# Patient Record
Sex: MALE | Race: WHITE | Employment: FULL TIME | URBAN - METROPOLITAN AREA
[De-identification: names, ages, dates, MRNs, and addresses within clinical notes are randomized per-mention and may not be internally consistent; named-entity substitution may affect disease eponyms.]

---

## 2018-07-19 ENCOUNTER — OFFICE VISIT (OUTPATIENT)
Dept: ORTHOPEDIC SURGERY | Age: 33
End: 2018-07-19

## 2018-07-19 VITALS — WEIGHT: 182 LBS | BODY MASS INDEX: 26.96 KG/M2 | HEIGHT: 69 IN

## 2018-07-19 DIAGNOSIS — S43.432A TEAR OF LEFT GLENOID LABRUM, INITIAL ENCOUNTER: Primary | ICD-10-CM

## 2018-07-19 PROCEDURE — L3670 SO ACRO/CLAV CAN WEB PRE OTS: HCPCS | Performed by: ORTHOPAEDIC SURGERY

## 2018-07-19 PROCEDURE — 99204 OFFICE O/P NEW MOD 45 MIN: CPT | Performed by: ORTHOPAEDIC SURGERY
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
## 2018-07-25 ENCOUNTER — TELEPHONE (OUTPATIENT)
Dept: ORTHOPEDIC SURGERY | Age: 33
End: 2018-07-25

## 2018-07-25 NOTE — TELEPHONE ENCOUNTER
Auth: NPR  Date: 8/10/18  Reference # None  Type of SX: Outpatient  Location: [facility] [address] [id number]   SX area: [facility]

## 2018-08-01 DIAGNOSIS — S43.432D TEAR OF LEFT GLENOID LABRUM, SUBSEQUENT ENCOUNTER: Primary | ICD-10-CM

## 2018-08-01 RX ORDER — MELOXICAM 15 MG/1
15 TABLET ORAL DAILY
Qty: 30 TABLET | Refills: 3 | Status: SHIPPED | OUTPATIENT
Start: 2018-08-10

## 2018-08-01 RX ORDER — OXYCODONE HYDROCHLORIDE 10 MG/1
10 TABLET ORAL EVERY 8 HOURS PRN
Qty: 21 TABLET | Refills: 0 | Status: SHIPPED | OUTPATIENT
Start: 2018-08-10 | End: 2018-08-17

## 2018-08-08 ENCOUNTER — TELEPHONE (OUTPATIENT)
Dept: ORTHOPEDIC SURGERY | Age: 33
End: 2018-08-08

## 2018-08-10 ENCOUNTER — TELEPHONE (OUTPATIENT)
Dept: ORTHOPEDIC SURGERY | Age: 33
End: 2018-08-10

## 2018-08-10 ENCOUNTER — HOSPITAL ENCOUNTER (OUTPATIENT)
Dept: SURGERY | Age: 33
Discharge: HOME | End: 2018-08-10
Attending: ORTHOPAEDIC SURGERY | Admitting: ORTHOPAEDIC SURGERY

## 2018-08-10 VITALS
HEART RATE: 92 BPM | BODY MASS INDEX: 29.83 KG/M2 | SYSTOLIC BLOOD PRESSURE: 126 MMHG | TEMPERATURE: 98.5 F | RESPIRATION RATE: 16 BRPM | HEIGHT: 69 IN | OXYGEN SATURATION: 96 % | WEIGHT: 201.4 LBS | DIASTOLIC BLOOD PRESSURE: 60 MMHG

## 2018-08-10 RX ORDER — SODIUM CHLORIDE 9 MG/ML
INJECTION, SOLUTION INTRAVENOUS CONTINUOUS
Status: DISCONTINUED | OUTPATIENT
Start: 2018-08-10 | End: 2018-08-11 | Stop reason: HOSPADM

## 2018-08-10 RX ORDER — DIPHENHYDRAMINE HYDROCHLORIDE 50 MG/ML
12.5 INJECTION INTRAMUSCULAR; INTRAVENOUS
Status: ACTIVE | OUTPATIENT
Start: 2018-08-10 | End: 2018-08-10

## 2018-08-10 RX ORDER — SODIUM CHLORIDE 0.9 % (FLUSH) 0.9 %
10 SYRINGE (ML) INJECTION PRN
Status: DISCONTINUED | OUTPATIENT
Start: 2018-08-10 | End: 2018-08-11 | Stop reason: HOSPADM

## 2018-08-10 RX ORDER — FENTANYL CITRATE 50 UG/ML
50 INJECTION, SOLUTION INTRAMUSCULAR; INTRAVENOUS EVERY 5 MIN PRN
Status: DISCONTINUED | OUTPATIENT
Start: 2018-08-10 | End: 2018-08-11 | Stop reason: HOSPADM

## 2018-08-10 RX ORDER — CEFAZOLIN SODIUM 2 G/100ML
2 INJECTION, SOLUTION INTRAVENOUS
Status: COMPLETED | OUTPATIENT
Start: 2018-08-10 | End: 2018-08-10

## 2018-08-10 RX ORDER — ACETAMINOPHEN 325 MG/1
650 TABLET ORAL EVERY 4 HOURS PRN
Status: DISCONTINUED | OUTPATIENT
Start: 2018-08-10 | End: 2018-08-11 | Stop reason: HOSPADM

## 2018-08-10 RX ORDER — FENTANYL CITRATE 50 UG/ML
25 INJECTION, SOLUTION INTRAMUSCULAR; INTRAVENOUS EVERY 5 MIN PRN
Status: DISCONTINUED | OUTPATIENT
Start: 2018-08-10 | End: 2018-08-11 | Stop reason: HOSPADM

## 2018-08-10 RX ORDER — HYDROCODONE BITARTRATE AND ACETAMINOPHEN 5; 325 MG/1; MG/1
1 TABLET ORAL PRN
Status: ACTIVE | OUTPATIENT
Start: 2018-08-10 | End: 2018-08-10

## 2018-08-10 RX ORDER — PROMETHAZINE HYDROCHLORIDE 25 MG/ML
6.25 INJECTION, SOLUTION INTRAMUSCULAR; INTRAVENOUS EVERY 30 MIN PRN
Status: DISCONTINUED | OUTPATIENT
Start: 2018-08-10 | End: 2018-08-11 | Stop reason: HOSPADM

## 2018-08-10 RX ORDER — MEPERIDINE HYDROCHLORIDE 25 MG/ML
12.5 INJECTION INTRAMUSCULAR; INTRAVENOUS; SUBCUTANEOUS EVERY 5 MIN PRN
Status: DISCONTINUED | OUTPATIENT
Start: 2018-08-10 | End: 2018-08-11 | Stop reason: HOSPADM

## 2018-08-10 RX ORDER — HYDROMORPHONE HCL 110MG/55ML
0.25 PATIENT CONTROLLED ANALGESIA SYRINGE INTRAVENOUS EVERY 5 MIN PRN
Status: DISCONTINUED | OUTPATIENT
Start: 2018-08-10 | End: 2018-08-11 | Stop reason: HOSPADM

## 2018-08-10 RX ORDER — HYDROCODONE BITARTRATE AND ACETAMINOPHEN 5; 325 MG/1; MG/1
2 TABLET ORAL PRN
Status: ACTIVE | OUTPATIENT
Start: 2018-08-10 | End: 2018-08-10

## 2018-08-10 RX ORDER — HYDROMORPHONE HCL 110MG/55ML
0.5 PATIENT CONTROLLED ANALGESIA SYRINGE INTRAVENOUS EVERY 5 MIN PRN
Status: DISCONTINUED | OUTPATIENT
Start: 2018-08-10 | End: 2018-08-11 | Stop reason: HOSPADM

## 2018-08-10 RX ORDER — SODIUM CHLORIDE 0.9 % (FLUSH) 0.9 %
10 SYRINGE (ML) INJECTION EVERY 12 HOURS SCHEDULED
Status: DISCONTINUED | OUTPATIENT
Start: 2018-08-10 | End: 2018-08-11 | Stop reason: HOSPADM

## 2018-08-10 RX ORDER — OXYCODONE HYDROCHLORIDE 5 MG/1
5 TABLET ORAL
Status: COMPLETED | OUTPATIENT
Start: 2018-08-10 | End: 2018-08-10

## 2018-08-10 RX ORDER — LIDOCAINE HYDROCHLORIDE 10 MG/ML
1 INJECTION, SOLUTION EPIDURAL; INFILTRATION; INTRACAUDAL; PERINEURAL
Status: ACTIVE | OUTPATIENT
Start: 2018-08-10 | End: 2018-08-10

## 2018-08-10 RX ADMIN — CEFAZOLIN SODIUM 2 G: 2 INJECTION, SOLUTION INTRAVENOUS at 07:30

## 2018-08-10 RX ADMIN — SODIUM CHLORIDE: 9 INJECTION, SOLUTION INTRAVENOUS at 06:57

## 2018-08-10 RX ADMIN — OXYCODONE HYDROCHLORIDE 5 MG: 5 TABLET ORAL at 10:31

## 2018-08-10 RX ADMIN — Medication 0.5 MG: at 10:09

## 2018-08-10 ASSESSMENT — PAIN DESCRIPTION - PAIN TYPE
TYPE: SURGICAL PAIN
TYPE: SURGICAL PAIN

## 2018-08-10 ASSESSMENT — PAIN DESCRIPTION - LOCATION
LOCATION: SHOULDER
LOCATION: SHOULDER

## 2018-08-10 ASSESSMENT — PAIN SCALES - GENERAL
PAINLEVEL_OUTOF10: 8
PAINLEVEL_OUTOF10: 9

## 2018-08-10 ASSESSMENT — PAIN DESCRIPTION - DESCRIPTORS: DESCRIPTORS: PINS AND NEEDLES;STABBING

## 2018-08-10 ASSESSMENT — PAIN DESCRIPTION - ORIENTATION
ORIENTATION: LEFT
ORIENTATION: LEFT

## 2018-08-10 ASSESSMENT — PAIN - FUNCTIONAL ASSESSMENT: PAIN_FUNCTIONAL_ASSESSMENT: 0-10

## 2018-08-10 NOTE — OP NOTE
[facility]       [address], [address] [facility]       Operative note by  Ludin Cummins. Gayathri Cannon MS, DO  Orthopedic surgeon  Orth[employer]ics sports [employer] trained  Board-certified  Team Physician for [employer]                       Shoulder Arthroscopy      Patient Name:  Philipp Mathur    YOB: 1985    Medical  Record number: [id number]    Account number:  [de-identified]    Date Of Surgery: 8/10/2018    Date Of Dictation: 8/10/2018    Location: [place] [address]    Surgeon: Dr. Bernarda Baltazar    Assistant: None    Anesthesia: Local with General    Indications:  Chronic pain not alleviated by conservative therapy, positive MRI, not improved with conservative care    Complications: None    Estimated blood loss: Minimal    Preoperative antibiotics: Given and documented in the chart        Preoperative diagnosis :  1.  Left shoulder anterior Type 2 labral tear  2. Left shoulder subacromial impingement            Postoperative diagnosis :  1.  Left shoulder Type 2 labral tear  2. Left shoulder type I labral tear with synovitis and undersurface rotator cuff tear  3. Left shoulder subacromial impingement  4. Left shoulder Meso-acromion         Procedure performed:  1. Left shoulder diagnostic arthroscopy  2. Left shoulder arthroscopic anterior superior labral repair  3. Left shoulder subacromial decompression, debridement of the Meso-acromion  4. Left shoulder long head of the biceps tenodesis using 2 dual loaded ultra braid's  5. Left shoulder labral debridement, synovectomy, undersurface rotator cuff debridement  6.   Left shoulder rotator cuff augmentation using the regeneten augmentation graft to cover the rotator cuff repair           Procedure in detail:  Patient was seen and evaluated A history and physical was obtained a written consent was discussed and to the labrum superior to the labrum and anterior to the labrum. The labral tissue was smoothed off with a shaver and a bipolar both posterior superiorly and anteriorly. The undersurface rotator cuff fraying was removed 1st with a shaver then the edges were smoothed off with the bipolar. Any chondral surface fraying was removed either with the shaver, bipolar or probe. Seeing the type II SLAP tear I went ahead and did a long head of the biceps tenodesis using a 70° up suture passer to pass 2 ultra braid's through the long head of the biceps into the rotator cuff I released the attachment and then tied the sutures to the rotator cuff with a Morgan loop followed by 3 one half hitches. Probing this was a very stable construct I irrigated copiously and removed all instruments from the glenohumeral joint itself. Upon her initial diagnostic arthroscopy I could see that the anterior superior labrum was torn. Using the camera in the posterior portal I went ahead and put a cannula in the anterior portal.  Through the cannula I used both the bipolar, shaver, and a rasp to free the labral tissue and create a good healing bed. Once the labral tissue was freely movable with the ice tong I proceeded to put in the 1st 2.8mm  dual loaded suture anchor of 1 anchors. The anchor was put in by inserting the drill guide drilling through the drill guide. Following drilling the  hole the anchor was inserted to the laser line. Then I removing the  and the drill guide leaving all 4 sutures buried in the bone. Now using a 18-gauge spinal needle I began to place the 2nd cannula once I found a good location I used a sharp scalpel to open the skin and a blunt switching stick to enter the joint and then the cannula and the  were used over the switching stick until it was inserted all the way into the joint itself.   I'm remove the sutures with an ice tong to the superior portal.  At this time I used understands the instructions. _____________________         Sanya Murphy MS, DO         Orthopedic Surgeon          [employer] trained         Board-certified         Team Physician for [employer]

## 2018-08-10 NOTE — ANESTHESIA POST-OP
Anesthesia Post-op Note    Patient: Ashu Chong  MRN: [id number]  YOB: 1985  Date of evaluation: 8/10/2018  Time:  2:00 PM     Procedure(s) Performed:     Last Vitals: /60   Pulse 92   Temp 98.5 °F (36.9 °C) (Temporal)   Resp 16   Ht 5' 9\" (1.753 m)   Wt 201 lb 6.4 oz (91.4 kg)   SpO2 96%   BMI 29.74 kg/m²     Vianey Phase I: Vianey Score: 10    Vianey Phase II: Vianey Score: 10    Anesthesia Post Evaluation    Final anesthesia type: general  Patient location during evaluation: PACU  Patient participation: complete - patient participated  Level of consciousness: awake  Pain score: 2  Airway patency: patent  Nausea & Vomiting: no nausea  Complications: no  Cardiovascular status: blood pressure returned to baseline  Respiratory status: acceptable  Hydration status: euvolemic        Trixie Wiess MD  2:00 PM

## 2018-08-10 NOTE — PROGRESS NOTES
CLINICAL PHARMACY NOTE: MEDS TO [address] [place] Patient?: No  Total # of Prescriptions Filled: 2   The following medications were delivered to the patient:  · Mobic 15  · Oxycodone 10  Total # of Interventions Completed: 0  Time Spent (min): 15    Additional Documentation:  Patient's spouse signed for prescriptions

## 2018-08-10 NOTE — ANESTHESIA PRE-OP
Department of Anesthesiology  Preprocedure Note       Name:  Denisse Murillo   Age:  34 y.o.  :  1985                                          MRN:  [id number]         Date:  8/10/2018      Surgeon:    Procedure:    Medications prior to admission:   Prior to Admission medications    Medication Sig Start Date End Date Taking? Authorizing Provider   meloxicam (MOBIC) 15 MG tablet Take 1 tablet by mouth daily 8/10/18   [facility], DO   oxyCODONE HCl (OXY-IR) 10 MG immediate release tablet Take 1 tablet by mouth every 8 hours as needed for Pain for up to 7 days. Mike Feather Date: 8/10/18 8/10/18 8/17/18  [place], DO       Current medications:    Current Outpatient Prescriptions   Medication Sig Dispense Refill    meloxicam (MOBIC) 15 MG tablet Take 1 tablet by mouth daily 30 tablet 3    oxyCODONE HCl (OXY-IR) 10 MG immediate release tablet Take 1 tablet by mouth every 8 hours as needed for Pain for up to 7 days. Mike Feather Date: 8/10/18 21 tablet 0     Current Facility-Administered Medications   Medication Dose Route Frequency Provider Last Rate Last Dose    ceFAZolin (ANCEF) 2 g in dextrose 4 % 100 mL IVPB (premix)  2 g Intravenous On Call to DO Aditi           Allergies:  No Known Allergies    Problem List:    Patient Active Problem List   Diagnosis Code    Tear of left glenoid labrum G45.373B       Past Medical History:  History reviewed. No pertinent past medical history.     Past Surgical History:        Procedure Laterality Date    CYST REMOVAL      HAND SURGERY      metal pin in left    SHOULDER SURGERY         Social History:    Social History   Substance Use Topics    Smoking status: Former Smoker     Packs/day: 0.50     Types: Cigarettes    Smokeless tobacco: Never Used    Alcohol use Yes      Comment: social                                Counseling given: Not Answered      Vital Signs (Current):   Vitals:    08/10/18 0615 08/10/18 0629   BP:  136/77   Pulse:  63 Resp:  16   Temp:  98 °F (36.7 °C)   TempSrc:  Temporal   SpO2:  98%   Weight: 201 lb 6.4 oz (91.4 kg)    Height: 5' 9\" (1.753 m)                                               BP Readings from Last 3 Encounters:   08/10/18 136/77   01/31/12 144/70       NPO Status: Time of last liquid consumption: 2345                        Time of last solid consumption: 2000                        Date of last liquid consumption: 08/09/18                        Date of last solid food consumption: 08/09/18    BMI:   Wt Readings from Last 3 Encounters:   08/10/18 201 lb 6.4 oz (91.4 kg)   08/06/18 199 lb (90.3 kg)   07/19/18 182 lb (82.6 kg)     Body mass index is 29.74 kg/m². Anesthesia Evaluation  Patient summary reviewed and Nursing notes reviewed  Airway: Mallampati: II  TM distance: >3 FB   Neck ROM: full  Mouth opening: > = 3 FB Dental:          Pulmonary:                              Cardiovascular:  Exercise tolerance: good (>4 METS),                     Neuro/Psych:               GI/Hepatic/Renal:             Endo/Other:                     Abdominal:           Vascular:                                        Anesthesia Plan      general     ASA 1           MIPS: Postoperative opioids intended, Prophylactic antiemetics administered and Postoperative trial extubation. Anesthetic plan and risks discussed with patient. Plan discussed with CRNA.     Attending anesthesiologist reviewed and agrees with Jason Matson MD   8/10/2018

## 2018-08-10 NOTE — PROGRESS NOTES
Patient arrived from OR to PACU. Oral airway/ nasal trumpet in place. Oxygen saturation 96% on 6L simple mask. NSR on the monitor. VSS. drsg to left shoulder CDI. Skin warm, brisk cap refill, radial pulse palpable. Sling in place. Ice applied.  Will continue to monitor    Electronically signed by Ross Escalera RN on 8/10/2018 at 1607

## 2018-08-14 ENCOUNTER — TELEPHONE (OUTPATIENT)
Dept: ORTHOPEDIC SURGERY | Age: 33
End: 2018-08-14

## 2018-08-14 NOTE — TELEPHONE ENCOUNTER
GAVE ALL [facility] MEDICAL RECORDS FROM 6/27/18 TO THE PRESENT TO MAIRA PALMA TO SCAN INTO [facility] FOR THE [facility].

## 2018-08-17 DIAGNOSIS — S43.432D TEAR OF LEFT GLENOID LABRUM, SUBSEQUENT ENCOUNTER: Primary | ICD-10-CM

## 2018-08-20 ENCOUNTER — OFFICE VISIT (OUTPATIENT)
Dept: ORTHOPEDIC SURGERY | Age: 33
End: 2018-08-20

## 2018-08-20 VITALS — HEIGHT: 69 IN | WEIGHT: 201 LBS | BODY MASS INDEX: 29.77 KG/M2

## 2018-08-20 DIAGNOSIS — S43.432D TEAR OF LEFT GLENOID LABRUM, SUBSEQUENT ENCOUNTER: Primary | ICD-10-CM

## 2018-08-20 PROCEDURE — 99024 POSTOP FOLLOW-UP VISIT: CPT | Performed by: ORTHOPAEDIC SURGERY

## 2018-08-20 NOTE — PROGRESS NOTES
History of Present of Illness:  S/P Shoulder Arthroscopy   The patient returns today for left shoulder evaluation 1 week after shoulder arthroscopy. Examination:  Inspection reveals warm, dry, intact skin. There is no adenopathy. The distal neurovascular exam is grossly intact. Examination of the contralateral shoulder reveals no atrophy or deformity. The skin is warm and dry. Range of motion is within normal limits. There is no focal tenderness with palpation. Provocative SLAP, biceps tension, apprehension AC joint or rotator cuff tests are negative. Strength is graded 5/5 in all muscle groups. The distal neurovascular exam is grossly intact. Cervical spine: The skin is warm and dry. There is no swelling, warmth, or erythema. Range of motion is within normal limits. There is no paraspinal or spinous process tenderness. Spurling's sign is negative and did not produce shoulder pain. The distal neurovascular exam is grossly intact. Diagnostic Test Findings:    No orders of the defined types were placed in this encounter. Treatment Plan:  Start physical therapy follow-up in 4-6 weeks        Disclaimer: \"This note was dictated with voice recognition software. Though review and correction are routine, we apologize for any errors. \"

## 2018-08-24 ENCOUNTER — HOSPITAL ENCOUNTER (OUTPATIENT)
Dept: PHYSICAL THERAPY | Age: 33
Discharge: HOME | End: 2018-08-31
Admitting: ORTHOPAEDIC SURGERY
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
## 2018-08-24 NOTE — FLOWSHEET NOTE
tenodesis    Exercises/Interventions:   Therapeutic Ex Wt / Resistance Sets/sec Reps Notes          Supine wand ER  10\" 10    scap retraction  5\" 20    scap shrug/depression  5\" 20    Table slides flexion PROM  10\" 10    pendulums  30\" 2                                                                                               Manual Intervention       Shld /GH Mobs       Post Cap mobs       Thoracic/Rib manipualtion       CT MT/Mobs       PROM MT  5 min                   NMR re-education       T-spine Ext       GH depres/compress       Sima Scap Bio       Scap/GH NMR       Body blade       Wall ball roll       Wall Ball bounce                  Therapeutic Exercise and NMR EXR  [x] (43630) Provided verbal/tactile cueing for activities related to strengthening, flexibility, endurance, ROM  for improvements in scapular, scapulothoracic and UE control with self care, reaching, carrying, lifting, house/yardwork, driving/computer work.    [] (22295) Provided verbal/tactile cueing for activities related to improving balance, coordination, kinesthetic sense, posture, motor skill, proprioception  to assist with  scapular, scapulothoracic and UE control with self care, reaching, carrying, lifting, house/yardwork, driving/computer work. Therapeutic Activities:    [] (54081 or 87583) Provided verbal/tactile cueing for activities related to improving balance, coordination, kinesthetic sense, posture, motor skill, proprioception and motor activation to allow for proper function of scapular, scapulothoracic and UE control with self care, carrying, lifting, driving/computer work.      Home Exercise Program:    [x] (03463) Reviewed/Progressed HEP activities related to strengthening, flexibility, endurance, ROM of scapular, scapulothoracic and UE control with self care, reaching, carrying, lifting, house/yardwork, driving/computer work  [] (83806) Reviewed/Progressed HEP activities related to improving balance, coordination, reaching for high shelf without increased symptoms     Progression Towards Functional goals:  [] Patient is progressing as expected towards functional goals listed. [] Progression is slowed due to complexities listed. [] Progression has been slowed due to co-morbidities. [x] Plan just implemented, too soon to assess goals progression  [] Other:     ASSESSMENT:  See eval    Treatment/Activity Tolerance:  [x] Patient tolerated treatment well [] Patient limited by fatique  [] Patient limited by pain  [] Patient limited by other medical complications  [] Other:     Prognosis: [x] Good [] Fair  [] Poor    Patient Requires Follow-up: [x] Yes  [] No    PLAN: See eval  [] Continue per plan of care [] Alter current plan (see comments)  [x] Plan of care initiated [] Hold pending MD visit [] Discharge    Electronically signed by:  Kika Torrez PT, DPT

## 2018-09-01 ENCOUNTER — HOSPITAL ENCOUNTER (OUTPATIENT)
Dept: PHYSICAL THERAPY | Age: 33
Discharge: HOME | End: 2018-09-01
Attending: ORTHOPAEDIC SURGERY | Admitting: ORTHOPAEDIC SURGERY

## 2018-09-07 ENCOUNTER — HOSPITAL ENCOUNTER (OUTPATIENT)
Dept: PHYSICAL THERAPY | Age: 33
Discharge: HOME | End: 2018-09-08
Admitting: ORTHOPAEDIC SURGERY

## 2018-09-07 NOTE — FLOWSHEET NOTE
[address], [facility]    Physical Therapy Daily Treatment Note  Date:  2018    Patient Name:  Alysha Reina    :  1985  MRN: [id number]  Restrictions/Precautions:    Medical/Treatment Diagnosis Information:  · Diagnosis: s/p left shoulder scope 8/15/18; tear of left glenoid labrum S43.432D  · Treatment Diagnosis: L shoulder pain  Insurance/Certification information:  PT Insurance Information: UMR/[facility], $2600 deductible, no copay, 25 OP visits  Physician Information:  Referring Practitioner: Terri Serrano DO  Plan of care signed (Y/N):     Date of Patient follow up with Physician:  [facility]     G-Code (if applicable):      Date G-Code Applied:         Progress Note: [x]  Yes  []  No  Next due by: Visit #10      Latex Allergy:  [x]NO      []YES  Preferred Language for Healthcare:   [x]English       []other:    Visit # Insurance Allowable   3 25     Pain level:  3/10     SUBJECTIVE:   Reports having increased pain when he gets startled when hes sleeping in the recliner. Icing 1x/ day not wearing sling during the day. 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 OBJECTIVE:   Observation:   Test measurements:    18 PROM left shoulder. Excellent PROM. Portals healed and closed. Advised begin STM with Vit E to assist in healing portals.      RESTRICTIONS/PRECAUTIONS:   Left shoulder diagnostic arthroscopy  2.  Left shoulder arthroscopic anterior superior labral repair  3.  Left shoulder subacromial decompression, debridement of the Meso-acromion  4.  Left shoulder long head of the biceps tenodesis using 2 dual loaded ultra nadeem  5.  Left shoulder labral debridement, synovectomy, undersurface rotator cuff debridement  6.  Left shoulder rotator cuff augmentation using the regeneten augmentation graft to cover the biceps tenodesis    Exercises/Interventions: 25'  Therapeutic Ex Wt / Resistance Sets/sec Reps Notes          Supine wand ER  15\" 5x    scap retraction  5\" 30x    scap shrug/depression  5\" 20    Table slides flexion PROM  10\" 10    pendulums  30\" 2    Passive elbow flexion/extension     Isometrics Flex/ER/IR   10\" 10x                                                                                 Manual Intervention       Shld /GH Mobs       Post Cap mobs       Thoracic/Rib manipualtion       CT MT/Mobs       PROM MT  10 min                   NMR re-education       T-spine Ext       GH depres/compress       Sima Scap Bio       Scap/GH NMR       Body blade       Wall ball roll       Wall Ball bounce                  Therapeutic Exercise and NMR EXR  [x] (95101) Provided verbal/tactile cueing for activities related to strengthening, flexibility, endurance, ROM  for improvements in scapular, scapulothoracic and UE control with self care, reaching, carrying, lifting, house/yardwork, driving/computer work.    [] (40619) Provided verbal/tactile cueing for activities related to improving balance, coordination, kinesthetic sense, posture, motor skill, proprioception  to assist with  scapular, scapulothoracic and UE control with self care, reaching, carrying, lifting, house/yardwork, driving/computer work. Therapeutic Activities:    [] (11970 or 78011) Provided verbal/tactile cueing for activities related to improving balance, coordination, kinesthetic sense, posture, motor skill, proprioception and motor activation to allow for proper function of scapular, scapulothoracic and UE control with self care, carrying, lifting, driving/computer work.      Home Exercise Program:    [x] (36431) Reviewed/Progressed HEP activities related to strengthening, flexibility, endurance, ROM of scapular, scapulothoracic and UE control with self care, reaching, carrying, lifting, house/yardwork, driving/computer work  [] (69469) Reviewed/Progressed HEP activities related to improving balance, coordination, kinesthetic

## 2018-09-10 ENCOUNTER — HOSPITAL ENCOUNTER (OUTPATIENT)
Dept: PHYSICAL THERAPY | Age: 33
Discharge: HOME | End: 2018-09-11
Admitting: ORTHOPAEDIC SURGERY
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
## 2018-09-13 ENCOUNTER — HOSPITAL ENCOUNTER (OUTPATIENT)
Dept: PHYSICAL THERAPY | Age: 33
Discharge: HOME | End: 2018-09-14
Admitting: ORTHOPAEDIC SURGERY

## 2018-09-13 NOTE — FLOWSHEET NOTE
Prone Rows/EXT  2/3 10ea           scap shrug/depression     Counter slides flexion PROM     pendulums HEP         Isometrics Flex/ER/IR      Supine punch       SL ER  neutral 2 10x    TB ROWS/EXT  SL ER Green  Step aways 2 10 Avoid any bicep usage   wallslides  flex  10\" 10x                                                     Manual Intervention       Shld /GH Mobs       Post Cap mobs       Thoracic/Rib manipualtion       CT MT/Mobs       PROM MT  10 min                   NMR re-education       T-spine Ext       GH depres/compress       Sima Scap Bio       Scap/GH NMR       Body blade       Wall ball roll       Wall Ball bounce                  Therapeutic Exercise and NMR EXR  [x] (05644) Provided verbal/tactile cueing for activities related to strengthening, flexibility, endurance, ROM  for improvements in scapular, scapulothoracic and UE control with self care, reaching, carrying, lifting, house/yardwork, driving/computer work.    [] (70026) Provided verbal/tactile cueing for activities related to improving balance, coordination, kinesthetic sense, posture, motor skill, proprioception  to assist with  scapular, scapulothoracic and UE control with self care, reaching, carrying, lifting, house/yardwork, driving/computer work. Therapeutic Activities:    [] (22462 or 85087) Provided verbal/tactile cueing for activities related to improving balance, coordination, kinesthetic sense, posture, motor skill, proprioception and motor activation to allow for proper function of scapular, scapulothoracic and UE control with self care, carrying, lifting, driving/computer work.      Home Exercise Program:    [x] (02143) Reviewed/Progressed HEP activities related to strengthening, flexibility, endurance, ROM of scapular, scapulothoracic and UE control with self care, reaching, carrying, lifting, house/yardwork, driving/computer work  [] (99794) Reviewed/Progressed HEP activities related to improving balance, coordination, kinesthetic sense, posture, motor skill, proprioception of scapular, scapulothoracic and UE control with self care, reaching, carrying, lifting, house/yardwork, driving/computer work      Manual Treatments:  PROM / STM / Oscillations-Mobs:  G-I, II, III, IV (PA's, Inf., Post.)  [x] (53325) Provided manual therapy to mobilize soft tissue/joints of cervical/CT, scapular GHJ and UE for the purpose of modulating pain, promoting relaxation,  increasing ROM, reducing/eliminating soft tissue swelling/inflammation/restriction, improving soft tissue extensibility and allowing for proper ROM for normal function with self care, reaching, carrying, lifting, house/yardwork, driving/computer work    Modalities:  Vaso with stim x 15 min     Charges:  Timed Code Treatment Minutes: 60   Total Treatment Minutes: 75     [] EVAL  [x] CM(24408) x  2   [] IONTO  [] NMR (20993) x      [] VASO  [x] Manual (95145) x  1    [] Other:  [] TA x       [] Mech Traction (40857)  [] ES(attended) (49971)      [x] ES (un) (13836):     GOALS:  Patient stated goal: return to work, care for son, play softball    Therapist goals for Patient:   Short Term Goals: To be achieved in: 2 weeks  1. Independent in HEP and progression per patient tolerance, in order to prevent re-injury. 2. Patient will have a decrease in pain to facilitate improvement in movement, function, and ADLs as indicated by Functional Deficits. Long Term Goals: To be achieved in: 8 weeks  1. Disability index score of 15% or less for the DASH to assist with reaching prior level of function. 2. Patient will demonstrate increased AROM to [facility] to allow for proper joint functioning as indicated by patients Functional Deficits. 3. Patient will demonstrate an increase in Strength to within 10lbs to allow for proper functional mobility as indicated by patients Functional Deficits. 4. Patient will return to ADLs without increased symptoms or restriction.    5. Pt will tolerate reaching

## 2018-09-17 ENCOUNTER — OFFICE VISIT (OUTPATIENT)
Dept: ORTHOPEDIC SURGERY | Age: 33
End: 2018-09-17

## 2018-09-17 VITALS
BODY MASS INDEX: 29.77 KG/M2 | DIASTOLIC BLOOD PRESSURE: 84 MMHG | WEIGHT: 201 LBS | SYSTOLIC BLOOD PRESSURE: 134 MMHG | HEIGHT: 69 IN

## 2018-09-17 DIAGNOSIS — S43.432A TEAR OF LEFT GLENOID LABRUM, INITIAL ENCOUNTER: Primary | ICD-10-CM

## 2018-09-17 PROCEDURE — 99024 POSTOP FOLLOW-UP VISIT: CPT | Performed by: ORTHOPAEDIC SURGERY

## 2018-09-17 NOTE — PROGRESS NOTES
History of Present of Illness:  S/P Rotator Cuff Repair  The patient returns today for left shoulder evaluation 5-1/2 weeks after rotator cuff repair    Examination:  Inspection reveals warm, dry, intact skin. There is no adenopathy. The distal neurovascular exam is grossly intact. Examination of the contralateral shoulder reveals no atrophy or deformity. The skin is warm and dry. Range of motion is within normal limits. There is no focal tenderness with palpation. Provocative SLAP, biceps tension, apprehension AC joint or rotator cuff tests are negative. Strength is graded 5/5 in all muscle groups outside of the rotator cuff. Rotator cuff strength is not tested. The distal neurovascular exam is grossly intact. Cervical spine: The skin is warm and dry. There is no swelling, warmth, or erythema. Range of motion is within normal limits. There is no paraspinal or spinous process tenderness. Spurling's sign is negative and did not produce shoulder pain. The distal neurovascular exam is grossly intact. Diagnostic Test Findings:    No orders of the defined types were placed in this encounter. Treatment Plan:  Looks good good motion good overall function and very please worries outward get him out of the sling and start him in a more rigorous physical therapy      Disclaimer: \"This note was dictated with voice recognition software. Though review and correction are routine, we apologize for any errors. \"

## 2018-09-17 NOTE — LETTER
[facility]  [address] [facility] [address]  Phone: [phone]  Fax: [phone] [facility] Ms[place], DO        September 17, 2018     Patient: Keven Gallagher   YOB: 1985   Date of Visit: 9/17/2018       To Whom It May Concern: It is my medical opinion that Keven Gallagher may return to work on 9/26/18 with the following restrictions: lifting/carrying not to exceed 15 lbs. .    If you have any questions or concerns, please don't hesitate to call.     Sincerely,         Henri Espinosa DO

## 2018-09-18 ENCOUNTER — HOSPITAL ENCOUNTER (OUTPATIENT)
Dept: PHYSICAL THERAPY | Age: 33
Discharge: HOME | End: 2018-09-19
Admitting: ORTHOPAEDIC SURGERY

## 2018-09-21 ENCOUNTER — HOSPITAL ENCOUNTER (OUTPATIENT)
Dept: PHYSICAL THERAPY | Age: 33
Discharge: HOME | End: 2018-09-22
Admitting: ORTHOPAEDIC SURGERY

## 2018-09-21 NOTE — FLOWSHEET NOTE
Bicep curl 2# 3 10    Supine punch 2# 3 10    SL ER  1# 2 10x    TB ROWS/EXT  TB ER Green  Step aways 2 10 Avoid any bicep usage   wallslides  flex  10\" 10x    Wall push ups  npv                                               Manual Intervention       Shld /GH Mobs       Post Cap mobs       Thoracic/Rib manipualtion       CT MT/Mobs       PROM MT  10 min                   NMR re-education       T-spine Ext       GH depres/compress  10\" 10 @ counter   Sima Scap Bio       [address] NMR       Body blade yellow 15\" 4    Wall ball roll       Wall Ball bounce                  Therapeutic Exercise and NMR EXR  [x] (72209) Provided verbal/tactile cueing for activities related to strengthening, flexibility, endurance, ROM  for improvements in scapular, scapulothoracic and UE control with self care, reaching, carrying, lifting, house/yardwork, driving/computer work.    [] (41146) Provided verbal/tactile cueing for activities related to improving balance, coordination, kinesthetic sense, posture, motor skill, proprioception  to assist with  scapular, scapulothoracic and UE control with self care, reaching, carrying, lifting, house/yardwork, driving/computer work. Therapeutic Activities:    [] (72174 or 51256) Provided verbal/tactile cueing for activities related to improving balance, coordination, kinesthetic sense, posture, motor skill, proprioception and motor activation to allow for proper function of scapular, scapulothoracic and UE control with self care, carrying, lifting, driving/computer work.      Home Exercise Program:    [x] (20772) Reviewed/Progressed HEP activities related to strengthening, flexibility, endurance, ROM of scapular, scapulothoracic and UE control with self care, reaching, carrying, lifting, house/yardwork, driving/computer work  [] (73399) Reviewed/Progressed HEP activities related to improving balance, coordination, kinesthetic sense, posture, motor skill, proprioception of scapular, scapulothoracic and UE control with self care, reaching, carrying, lifting, house/yardwork, driving/computer work      Manual Treatments:  PROM / STM / Oscillations-Mobs:  G-I, II, III, IV (PA's, Inf., Post.)  [x] (99600) Provided manual therapy to mobilize soft tissue/joints of cervical/CT, scapular GHJ and UE for the purpose of modulating pain, promoting relaxation,  increasing ROM, reducing/eliminating soft tissue swelling/inflammation/restriction, improving soft tissue extensibility and allowing for proper ROM for normal function with self care, reaching, carrying, lifting, house/yardwork, driving/computer work    Modalities: Ice with stim x 10 min     Charges:  Timed Code Treatment Minutes: 55   Total Treatment Minutes: 65     [] EVAL  [x] SP(83500) x  3   [] IONTO  [] NMR (22087) x      [] VASO  [x] Manual (79802) x  1    [] Other:  [] TA x       [] Mech Traction (94244)  [] ES(attended) (73940)      [x] ES (un) (85818):     GOALS:  Patient stated goal: return to work, care for son, play softball    Therapist goals for Patient:   Short Term Goals: To be achieved in: 2 weeks  1. Independent in HEP and progression per patient tolerance, in order to prevent re-injury. 2. Patient will have a decrease in pain to facilitate improvement in movement, function, and ADLs as indicated by Functional Deficits. Long Term Goals: To be achieved in: 8 weeks  1. Disability index score of 15% or less for the DASH to assist with reaching prior level of function. 2. Patient will demonstrate increased AROM to [facility] to allow for proper joint functioning as indicated by patients Functional Deficits. 3. Patient will demonstrate an increase in Strength to within 10lbs to allow for proper functional mobility as indicated by patients Functional Deficits. 4. Patient will return to ADLs without increased symptoms or restriction.    5. Pt will tolerate reaching for high shelf without increased symptoms     Progression Towards Functional goals:  [x] Patient is progressing as expected towards functional goals listed. [] Progression is slowed due to complexities listed. [] Progression has been slowed due to co-morbidities. [] Plan just implemented, too soon to assess goals progression  [] Other:     ASSESSMENT:  Progressed with strengthening with fatigue but no pain provocation. Treatment/Activity Tolerance:  [x] Patient tolerated treatment well [] Patient limited by fatique  [] Patient limited by pain  [] Patient limited by other medical complications  [] Other:     Prognosis: [x] Good [] Fair  [] Poor    Patient Requires Follow-up: [x] Yes  [] No    PLAN:  progress per protocol, monitor postural positions to avoid increasing the stress to anterior shoulder  [x] Continue per plan of care [] Alter current plan (see comments)  [] Plan of care initiated [] Hold pending MD visit [] Discharge    Electronically signed by:  Michelle Santana PT, DPT

## 2018-09-25 ENCOUNTER — HOSPITAL ENCOUNTER (OUTPATIENT)
Dept: PHYSICAL THERAPY | Age: 33
Setting detail: THERAPIES SERIES
Discharge: HOME | End: 2018-09-25
Payer: COMMERCIAL

## 2018-09-25 PROCEDURE — G0283 ELEC STIM OTHER THAN WOUND: HCPCS | Performed by: SPECIALIST/TECHNOLOGIST

## 2018-09-25 PROCEDURE — 97110 THERAPEUTIC EXERCISES: CPT | Performed by: SPECIALIST/TECHNOLOGIST

## 2018-09-25 PROCEDURE — 97140 MANUAL THERAPY 1/> REGIONS: CPT | Performed by: SPECIALIST/TECHNOLOGIST

## 2018-09-25 NOTE — FLOWSHEET NOTE
[address], [facility]    Physical Therapy Daily Treatment Note  Date:  2018    Patient Name:  Mayur Hawkins    :  1985  MRN: [id number]  Restrictions/Precautions:    Medical/Treatment Diagnosis Information:  · Diagnosis: s/p left shoulder scope 8/10/18; tear of left glenoid labrum S43.432D  · Treatment Diagnosis: L shoulder pain  Insurance/Certification information:  PT Insurance Information: [employer], $2600 deductible, no copay, 25 OP visits  Physician Information:  Referring Practitioner: Glenys Arroyo DO  Plan of care signed (Y/N):     Date of Patient follow up with Physician:  Barrington Tripathi     G-Code (if applicable):      Date G-Code Applied:         Progress Note: [x]  Yes  []  No  Next due by: Visit #10      Latex Allergy:  [x]NO      []YES  Preferred Language for Healthcare:   [x]English       []other:    Visit # Insurance Allowable   6 25     Pain level:      SUBJECTIVE:  Reports shoulder doing well and denies having any pain. RTW tomorrow at [facility] but is following a 15# restriction. has general shoulder soreness with ROM and is icing 1-2x day. OBJECTIVE: 6 weeks s/p  Observation:   Test measurements:    18 PROM left shoulder. Excellent PROM. Portals healed and closed. Advised begin STM with Vit E to assist in healing portals.      RESTRICTIONS/PRECAUTIONS:   Left shoulder diagnostic arthroscopy  2.  Left shoulder arthroscopic anterior superior labral repair  3.  Left shoulder subacromial decompression, debridement of the [place]o-acromion  4.  Left shoulder long head of the biceps tenodesis using 2 dual loaded ultra nadeem  5.  Left shoulder labral debridement, synovectomy, undersurface rotator cuff debridement  6.  Left shoulder rotator cuff augmentation using the regeneten augmentation graft to cover the biceps tenodesis    Exercises/Interventions:   Therapeutic Ex Wt / Resistance Sets/sec Reps Notes   pulleys  6 min       15\" 5x    Wand will tolerate reaching for high shelf without increased symptoms     Progression Towards Functional goals:  [x] Patient is progressing as expected towards functional goals listed. [] Progression is slowed due to complexities listed. [] Progression has been slowed due to co-morbidities. [] Plan just implemented, too soon to assess goals progression  [] Other:     ASSESSMENT:   Good PROM related to 6 weeks s/p but increased soreness related to stretching ABDuction and ER positions. Held bicep/ prone Habd due to increased stress to healing labral repair. Treatment/Activity Tolerance:  [x] Patient tolerated treatment well [] Patient limited by fatique  [] Patient limited by pain  [] Patient limited by other medical complications  [] Other:     Prognosis: [x] Good [] Fair  [] Poor    Patient Requires Follow-up: [x] Yes  [] No    PLAN:  progress per protocol, monitor postural positions to avoid increasing the stress to anterior shoulder.  Protect with RTW duties tomorrow  [x] Continue per plan of care [] Alter current plan (see comments)  [] Plan of care initiated [] Hold pending MD visit [] Discharge    Electronically signed by: Maddy Mullen PTA, [id number]

## 2018-09-28 ENCOUNTER — HOSPITAL ENCOUNTER (OUTPATIENT)
Dept: PHYSICAL THERAPY | Age: 33
Setting detail: THERAPIES SERIES
Discharge: HOME | End: 2018-09-28
Payer: COMMERCIAL

## 2018-09-28 PROCEDURE — 97110 THERAPEUTIC EXERCISES: CPT

## 2018-09-28 PROCEDURE — 97140 MANUAL THERAPY 1/> REGIONS: CPT

## 2018-09-28 PROCEDURE — G0283 ELEC STIM OTHER THAN WOUND: HCPCS

## 2018-09-28 NOTE — FLOWSHEET NOTE
kinesthetic sense, posture, motor skill, proprioception of scapular, scapulothoracic and UE control with self care, reaching, carrying, lifting, house/yardwork, driving/computer work      Manual Treatments:  PROM / STM / Oscillations-Mobs:  G-I, II, III, IV (PA's, Inf., Post.)  [x] (09909) Provided manual therapy to mobilize soft tissue/joints of cervical/CT, scapular GHJ and UE for the purpose of modulating pain, promoting relaxation,  increasing ROM, reducing/eliminating soft tissue swelling/inflammation/restriction, improving soft tissue extensibility and allowing for proper ROM for normal function with self care, reaching, carrying, lifting, house/yardwork, driving/computer work    Modalities: Ice with stim x 10 min     Charges:  Timed Code Treatment Minutes: 60   Total Treatment Minutes: 70     [] EVAL  [x] LX(32577) x  2   [] IONTO  [] NMR (55087) x      [] VASO  [x] Manual (88889) x  1    [] Other:  [] TA x       [] Mech Traction (55669)  [] ES(attended) (04956)      [x] ES (un) (39740):     GOALS:  Patient stated goal: return to work, care for son, play softball    Therapist goals for Patient:   Short Term Goals: To be achieved in: 2 weeks  1. Independent in HEP and progression per patient tolerance, in order to prevent re-injury. 2. Patient will have a decrease in pain to facilitate improvement in movement, function, and ADLs as indicated by Functional Deficits. Long Term Goals: To be achieved in: 8 weeks  1. Disability index score of 15% or less for the DASH to assist with reaching prior level of function. 2. Patient will demonstrate increased AROM to [facility] to allow for proper joint functioning as indicated by patients Functional Deficits. 3. Patient will demonstrate an increase in Strength to within 10lbs to allow for proper functional mobility as indicated by patients Functional Deficits. 4. Patient will return to ADLs without increased symptoms or restriction.    5. Pt will tolerate reaching

## 2018-10-02 ENCOUNTER — HOSPITAL ENCOUNTER (OUTPATIENT)
Dept: PHYSICAL THERAPY | Age: 33
Setting detail: THERAPIES SERIES
Discharge: HOME | End: 2018-10-02
Payer: COMMERCIAL

## 2018-10-02 PROCEDURE — 97112 NEUROMUSCULAR REEDUCATION: CPT | Performed by: SPECIALIST/TECHNOLOGIST

## 2018-10-02 PROCEDURE — 97110 THERAPEUTIC EXERCISES: CPT | Performed by: SPECIALIST/TECHNOLOGIST

## 2018-10-02 PROCEDURE — 97140 MANUAL THERAPY 1/> REGIONS: CPT | Performed by: SPECIALIST/TECHNOLOGIST

## 2018-10-02 NOTE — FLOWSHEET NOTE
Rows/EXT/ 3# 3/2 10ea    Supine shoulder flexion yellow 3 10    Bicep curl    Supine punch 3# 3 10    SL ER / SL punch 1# 2# 3 10x    TB ROWS/EXT   High rows   TB ER Blue  Green  Green  3 10    wallslides  flex            NO money  Red 3 10    UT and SCM stretch  30\" 3x                                Manual Intervention       Shld /GH Mobs       Post Cap mobs       Thoracic/Rib manipualtion       CT MT/Mobs       PROM MT  10 min                   NMR re-education       T-spine Ext       Rhythmic stabs  15\"5x10 @ counter   Sima Scap Bio       Scap/GH NMR       Body blade ER/IR & FLex     yellow 15\" 5x    Wall ball roll       Wall Ball bounce       Wall pushups+  2 10x        Therapeutic Exercise and NMR EXR  [x] (47451) Provided verbal/tactile cueing for activities related to strengthening, flexibility, endurance, ROM  for improvements in scapular, scapulothoracic and UE control with self care, reaching, carrying, lifting, house/yardwork, driving/computer work.    [] (69929) Provided verbal/tactile cueing for activities related to improving balance, coordination, kinesthetic sense, posture, motor skill, proprioception  to assist with  scapular, scapulothoracic and UE control with self care, reaching, carrying, lifting, house/yardwork, driving/computer work. Therapeutic Activities:    [] (73689 or 55172) Provided verbal/tactile cueing for activities related to improving balance, coordination, kinesthetic sense, posture, motor skill, proprioception and motor activation to allow for proper function of scapular, scapulothoracic and UE control with self care, carrying, lifting, driving/computer work.      Home Exercise Program:    [x] (10459) Reviewed/Progressed HEP activities related to strengthening, flexibility, endurance, ROM of scapular, scapulothoracic and UE control with self care, reaching, carrying, lifting, house/yardwork, driving/computer work  [] (65239) Reviewed/Progressed HEP activities related to improving balance, coordination, kinesthetic sense, posture, motor skill, proprioception of scapular, scapulothoracic and UE control with self care, reaching, carrying, lifting, house/yardwork, driving/computer work      Manual Treatments:  PROM / STM / Oscillations-Mobs:  G-I, II, III, IV (PA's, Inf., Post.) scapular tape applied to posterior shoulder to help with scapular retractions  [x] (26841) Provided manual therapy to mobilize soft tissue/joints of cervical/CT, scapular GHJ and UE for the purpose of modulating pain, promoting relaxation,  increasing ROM, reducing/eliminating soft tissue swelling/inflammation/restriction, improving soft tissue extensibility and allowing for proper ROM for normal function with self care, reaching, carrying, lifting, house/yardwork, driving/computer work    Modalities:  Gameready with stim x 15 min     Charges:  Timed Code Treatment Minutes: 60   Total Treatment Minutes: 75     [] EVAL  [x] ZA(21185) x  2   [] IONTO  [x] NMR (37187) x  1   [] VASO  [x] Manual (61007) x  1    [] Other:  [] TA x       [] Mech Traction (36531)  [] ES(attended) (18548)      [] ES (un) (13774):     GOALS:  Patient stated goal: return to work, care for son, play softball    Therapist goals for Patient:   Short Term Goals: To be achieved in: 2 weeks  1. Independent in HEP and progression per patient tolerance, in order to prevent re-injury. 2. Patient will have a decrease in pain to facilitate improvement in movement, function, and ADLs as indicated by Functional Deficits. Long Term Goals: To be achieved in: 8 weeks  1. Disability index score of 15% or less for the DASH to assist with reaching prior level of function. 2. Patient will demonstrate increased AROM to [facility] to allow for proper joint functioning as indicated by patients Functional Deficits.    3. Patient will demonstrate an increase in Strength to within 10lbs to allow for proper functional mobility as indicated by patients Functional Deficits. 4. Patient will return to ADLs without increased symptoms or restriction. 5. Pt will tolerate reaching for high shelf without increased symptoms     Progression Towards Functional goals:  [x] Patient is progressing as expected towards functional goals listed. [] Progression is slowed due to complexities listed. [] Progression has been slowed due to co-morbidities. [] Plan just implemented, too soon to assess goals progression  [] Other:     ASSESSMENT:   Pt reports having some increased upper trap soreness today but has a tendency to compensate when performing his exercises with recruiting the upper trap to assist with RC weakness. Good PROM demonstrated in all ranges which is appropriate for his current phase of healing. Applied scapular tape to decrease stress to trap and assist with avoiding shoulder hiking and to decrease stress to upper trap. Treatment/Activity Tolerance:  [x] Patient tolerated treatment well [] Patient limited by fatique  [] Patient limited by pain  [] Patient limited by other medical complications  [] Other:     Prognosis: [x] Good [] Fair  [] Poor    Patient Requires Follow-up: [x] Yes  [] No    PLAN:  progress per protocol, monitor postural positions to avoid increasing the stress to anterior shoulder.  Protect with RTW duties tomorrow  [x] Continue per plan of care [] Alter current plan (see comments)  [] Plan of care initiated [] Hold pending MD visit [] Discharge    Electronically signed by: Suman Cobb PTA, [id number]

## 2018-10-04 ENCOUNTER — HOSPITAL ENCOUNTER (OUTPATIENT)
Dept: PHYSICAL THERAPY | Age: 33
Setting detail: THERAPIES SERIES
Discharge: HOME | End: 2018-10-04
Payer: COMMERCIAL

## 2018-10-04 PROCEDURE — 97140 MANUAL THERAPY 1/> REGIONS: CPT | Performed by: SPECIALIST/TECHNOLOGIST

## 2018-10-04 PROCEDURE — 97110 THERAPEUTIC EXERCISES: CPT | Performed by: SPECIALIST/TECHNOLOGIST

## 2018-10-04 PROCEDURE — 97112 NEUROMUSCULAR REEDUCATION: CPT | Performed by: SPECIALIST/TECHNOLOGIST

## 2018-10-08 ENCOUNTER — HOSPITAL ENCOUNTER (OUTPATIENT)
Dept: PHYSICAL THERAPY | Age: 33
Setting detail: THERAPIES SERIES
Discharge: HOME | End: 2018-10-08
Payer: COMMERCIAL

## 2018-10-08 PROCEDURE — G0283 ELEC STIM OTHER THAN WOUND: HCPCS | Performed by: SPECIALIST/TECHNOLOGIST

## 2018-10-08 PROCEDURE — 97140 MANUAL THERAPY 1/> REGIONS: CPT | Performed by: SPECIALIST/TECHNOLOGIST

## 2018-10-08 PROCEDURE — 97110 THERAPEUTIC EXERCISES: CPT | Performed by: SPECIALIST/TECHNOLOGIST

## 2018-10-08 NOTE — FLOWSHEET NOTE
carrying, lifting, house/yardwork, driving/computer work  [] (23837) Reviewed/Progressed HEP activities related to improving balance, coordination, kinesthetic sense, posture, motor skill, proprioception of scapular, scapulothoracic and UE control with self care, reaching, carrying, lifting, house/yardwork, driving/computer work      Manual Treatments:  PROM / STM / Oscillations-Mobs:  G-I, II, III, IV (PA's, Inf., Post.) scapular tape applied to posterior shoulder to help with scapular retractions  [x] (60601) Provided manual therapy to mobilize soft tissue/joints of cervical/CT, scapular GHJ and UE for the purpose of modulating pain, promoting relaxation,  increasing ROM, reducing/eliminating soft tissue swelling/inflammation/restriction, improving soft tissue extensibility and allowing for proper ROM for normal function with self care, reaching, carrying, lifting, house/yardwork, driving/computer work    Modalities:  Gameready with stim x 15 min     Charges:  Timed Code Treatment Minutes: 45   Total Treatment Minutes: 60     [] EVAL  [x] UW(75747) x  2   [] IONTO  [] NMR (81377) x      [] VASO  [x] Manual (04599) x  1    [] Other:  [] TA x       [] Mech Traction (79451)  [] ES(attended) (88041)      [x] ES (un) (03961):     GOALS:  Patient stated goal: return to work, care for son, play softball    Therapist goals for Patient:   Short Term Goals: To be achieved in: 2 weeks  1. Independent in HEP and progression per patient tolerance, in order to prevent re-injury. 2. Patient will have a decrease in pain to facilitate improvement in movement, function, and ADLs as indicated by Functional Deficits. Long Term Goals: To be achieved in: 8 weeks  1. Disability index score of 15% or less for the DASH to assist with reaching prior level of function. 2. Patient will demonstrate increased AROM to [facility] to allow for proper joint functioning as indicated by patients Functional Deficits.    3. Patient will demonstrate an

## 2018-10-11 ENCOUNTER — HOSPITAL ENCOUNTER (OUTPATIENT)
Dept: PHYSICAL THERAPY | Age: 33
Setting detail: THERAPIES SERIES
Discharge: HOME | End: 2018-10-11
Payer: COMMERCIAL

## 2018-10-11 PROCEDURE — 97112 NEUROMUSCULAR REEDUCATION: CPT | Performed by: SPECIALIST/TECHNOLOGIST

## 2018-10-11 PROCEDURE — 97140 MANUAL THERAPY 1/> REGIONS: CPT | Performed by: SPECIALIST/TECHNOLOGIST

## 2018-10-11 PROCEDURE — 97110 THERAPEUTIC EXERCISES: CPT | Performed by: SPECIALIST/TECHNOLOGIST

## 2018-10-15 ENCOUNTER — OFFICE VISIT (OUTPATIENT)
Dept: ORTHOPEDIC SURGERY | Age: 33
End: 2018-10-15

## 2018-10-15 VITALS — WEIGHT: 201.06 LBS | HEIGHT: 69 IN | BODY MASS INDEX: 29.78 KG/M2

## 2018-10-15 DIAGNOSIS — S43.432D TEAR OF LEFT GLENOID LABRUM, SUBSEQUENT ENCOUNTER: Primary | ICD-10-CM

## 2018-10-15 PROCEDURE — 99024 POSTOP FOLLOW-UP VISIT: CPT | Performed by: ORTHOPAEDIC SURGERY

## 2018-10-16 ENCOUNTER — HOSPITAL ENCOUNTER (OUTPATIENT)
Dept: PHYSICAL THERAPY | Age: 33
Setting detail: THERAPIES SERIES
Discharge: HOME | End: 2018-10-16
Payer: COMMERCIAL

## 2018-10-16 PROCEDURE — 97110 THERAPEUTIC EXERCISES: CPT

## 2018-10-16 PROCEDURE — 97140 MANUAL THERAPY 1/> REGIONS: CPT

## 2018-10-16 PROCEDURE — G0283 ELEC STIM OTHER THAN WOUND: HCPCS

## 2018-10-16 PROCEDURE — 97112 NEUROMUSCULAR REEDUCATION: CPT

## 2018-10-16 NOTE — FLOWSHEET NOTE
pulleys  6 min      3'      Wand press Flex 10\"10x   Prone Rows/EXT/scaption 4/3# 3 10ea 0# scaption   Supine shoulder flexion Red 3 10 Green npv   Bicep curl 3# 3 10    Supine punch 4# 3 10    SL ER / SL punch 1# 3# 3 10x    TB ROWS/EXT   High rows   TB ER   TB flex Blue  Green  Blue  yellow 3 10ea    wallslides  flex  10\" 10x           NO money  Red 3 10    UT and SCM stretch           Ulnar nerve glides  1 30    Lat pulldown 30# 3 10           Manual Intervention       Shld /GH Mobs       Post Cap mobs       Thoracic/Rib manipualtion       Dry needling  5 min  R UT   PROM MT  10 min                   NMR re-education       T-spine Ext       Rhythmic stabs 7 ounces 15\" 5x    Sima Scap Bio       Scap/GH NMR       Body blade ER/IR & FLex  yellow 15\" 5x    Water stick flexion 3# 15\" 3x    Wall Ball bounce       Wall pushups+  2 10x        Therapeutic Exercise and NMR EXR  [x] (58171) Provided verbal/tactile cueing for activities related to strengthening, flexibility, endurance, ROM  for improvements in scapular, scapulothoracic and UE control with self care, reaching, carrying, lifting, house/yardwork, driving/computer work.    [] (79866) Provided verbal/tactile cueing for activities related to improving balance, coordination, kinesthetic sense, posture, motor skill, proprioception  to assist with  scapular, scapulothoracic and UE control with self care, reaching, carrying, lifting, house/yardwork, driving/computer work. Therapeutic Activities:    [] (46614 or 64012) Provided verbal/tactile cueing for activities related to improving balance, coordination, kinesthetic sense, posture, motor skill, proprioception and motor activation to allow for proper function of scapular, scapulothoracic and UE control with self care, carrying, lifting, driving/computer work.      Home Exercise Program:    [x] (08560) Reviewed/Progressed HEP activities related to strengthening, flexibility, endurance, ROM of scapular,

## 2018-10-18 ENCOUNTER — HOSPITAL ENCOUNTER (OUTPATIENT)
Dept: PHYSICAL THERAPY | Age: 33
Setting detail: THERAPIES SERIES
Discharge: HOME | End: 2018-10-18
Payer: COMMERCIAL

## 2018-10-18 PROCEDURE — 97110 THERAPEUTIC EXERCISES: CPT

## 2018-10-18 PROCEDURE — G0283 ELEC STIM OTHER THAN WOUND: HCPCS

## 2018-10-18 PROCEDURE — 97140 MANUAL THERAPY 1/> REGIONS: CPT

## 2018-10-18 PROCEDURE — 97112 NEUROMUSCULAR REEDUCATION: CPT

## 2018-10-18 NOTE — FLOWSHEET NOTE
Green npv   Bicep curl 3# 3 10    Supine punch 4# 3 10    SL ER / SL punch 1# 3# 3 10x    TB ROWS/EXT   High rows   TB ER   TB flex Blue  Green  Blue  yellow 3 10ea    wallslides  flex  10\" 10x           NO money  Red 3 10           Ulnar nerve glides  1 30    Lat pulldown 35# 3 10           Manual Intervention       Shld /GH Mobs       Post Cap mobs       Thoracic/Rib manipualtion       Dry needling  5 min  R UT   PROM MT  10 min                   NMR re-education       T-spine Ext       Rhythmic stabs 7 ounces 15\" 5x    Sima Scap Bio       Scap/GH NMR       Body blade ER/IR & FLex  yellow 15\" 5x    3# 15\" 3x    Wall Ball bounce       Wall pushups+  2 10x        Therapeutic Exercise and NMR EXR  [x] (39677) Provided verbal/tactile cueing for activities related to strengthening, flexibility, endurance, ROM  for improvements in scapular, scapulothoracic and UE control with self care, reaching, carrying, lifting, house/yardwork, driving/computer work.    [] (45449) Provided verbal/tactile cueing for activities related to improving balance, coordination, kinesthetic sense, posture, motor skill, proprioception  to assist with  scapular, scapulothoracic and UE control with self care, reaching, carrying, lifting, house/yardwork, driving/computer work. Therapeutic Activities:    [] (69855 or 40632) Provided verbal/tactile cueing for activities related to improving balance, coordination, kinesthetic sense, posture, motor skill, proprioception and motor activation to allow for proper function of scapular, scapulothoracic and UE control with self care, carrying, lifting, driving/computer work.      Home Exercise Program:    [x] (74583) Reviewed/Progressed HEP activities related to strengthening, flexibility, endurance, ROM of scapular, scapulothoracic and UE control with self care, reaching, carrying, lifting, house/yardwork, driving/computer work  [] (62292) Reviewed/Progressed HEP activities related to improving balance, coordination, kinesthetic sense, posture, motor skill, proprioception of scapular, scapulothoracic and UE control with self care, reaching, carrying, lifting, house/yardwork, driving/computer work      Manual Treatments:  PROM / STM / Oscillations-Mobs:  G-I, II, III, IV (PA's, Inf., Post.) scapular tape applied to posterior shoulder to help with scapular retractions  [x] (89389) Provided manual therapy to mobilize soft tissue/joints of cervical/CT, scapular GHJ and UE for the purpose of modulating pain, promoting relaxation,  increasing ROM, reducing/eliminating soft tissue swelling/inflammation/restriction, improving soft tissue extensibility and allowing for proper ROM for normal function with self care, reaching, carrying, lifting, house/yardwork, driving/computer work    Spoke with   regarding the use of Dry Needling     Dry needling manual therapy: consisted on the placement of 4 needles in the following muscles:  R UT.  A 30 mm needle was inserted, piston, rotated, and coned to produce intramuscular mobilization. These techniques were used to restore functional range of motion, reduce muscle spasm and induce healing in the corresponding musculature. (44803)  Clean Technique was utilized today while applying Dry needling treatment. The treatment sites where cleaned with 70% solution of  isopropyl alcohol . The PT washed their hands and utilized treatment gloves along with hand  prior to inserting the needles. All needles where removed and discarded in the appropriate sharps container. MD has given verbal and/or written approval for this treatment.             Modalities:  Gameready with stim x 15 min     Charges:  Timed Code Treatment Minutes: 70   Total Treatment Minutes: 85     [] EVAL  [x] RM(14320) x  2   [] IONTO  [x] NMR (01119) x  1   [] VASO  [x] Manual (82501) x  1    [] Other:  [] TA x       [] Mech Traction (06599)  [] ES(attended) (80154)      [x] ES (un) (34706): pending MD visit [] Discharge    Electronically signed by:  Emma Walter PT, DPT

## 2018-10-22 ENCOUNTER — HOSPITAL ENCOUNTER (OUTPATIENT)
Dept: PHYSICAL THERAPY | Age: 33
Setting detail: THERAPIES SERIES
End: 2018-10-22
Payer: COMMERCIAL

## 2018-10-23 ENCOUNTER — HOSPITAL ENCOUNTER (OUTPATIENT)
Dept: PHYSICAL THERAPY | Age: 33
Setting detail: THERAPIES SERIES
Discharge: HOME | End: 2018-10-23
Payer: COMMERCIAL

## 2018-10-23 PROCEDURE — G0283 ELEC STIM OTHER THAN WOUND: HCPCS

## 2018-10-23 PROCEDURE — 97140 MANUAL THERAPY 1/> REGIONS: CPT

## 2018-10-23 PROCEDURE — 97110 THERAPEUTIC EXERCISES: CPT

## 2018-10-23 PROCEDURE — 97112 NEUROMUSCULAR REEDUCATION: CPT

## 2018-10-23 NOTE — FLOWSHEET NOTE
[facility]    Physical Therapy Daily Treatment Note  Date:  10/23/2018    Patient Name:  Perez Betts    :  1985  MRN: [id number]  Restrictions/Precautions:    Medical/Treatment Diagnosis Information:  · Diagnosis: s/p left shoulder scope 8/10/18; tear of left glenoid labrum S43.432D  · Treatment Diagnosis: L shoulder pain  Insurance/Certification information:  PT Insurance Information: [employer]/[facility], $2600 deductible, no copay, 25 OP visits  Physician Information:  Referring Practitioner: Keven Padilla DO  Plan of care signed (Y/N):     Date of Patient follow up with Physician:  Elzbieta Souza     G-Code (if applicable):      Date G-Code Applied:         Progress Note: [x]  Yes  []  No  Next due by: Visit #10      Latex Allergy:  [x]NO      []YES  Preferred Language for Healthcare:   [x]English       []other:    Visit # Insurance Allowable   12 25     Pain level: 5/10 at rest, lateral arm. 5-9/10 with movements    SUBJECTIVE: still having increased pain with lifting, he believes it is from sleeping on his shoulder and getting a TDAP shot. OBJECTIVE: 9+ weeks s/p  Observation:   Test measurements:    18 PROM left shoulder. Excellent PROM. Portals healed and closed. Advised begin STM with Vit E to assist in healing portals.      RESTRICTIONS/PRECAUTIONS:   Left shoulder diagnostic arthroscopy  2.  Left shoulder arthroscopic anterior superior labral repair  3.  Left shoulder subacromial decompression, debridement of the Meso-acromion  4.  Left shoulder long head of the biceps tenodesis using 2 dual loaded ultra nadeem  5.  Left shoulder labral debridement, synovectomy, undersurface rotator cuff debridement  6.  Left shoulder rotator cuff augmentation using the regeneten augmentation graft to cover the biceps tenodesis    Exercises/Interventions:   Therapeutic Ex Wt / Resistance Sets/sec Reps Notes   pulleys  6 min     Prone Rows/EXT 4/3# 3 10ea

## 2018-10-25 ENCOUNTER — HOSPITAL ENCOUNTER (OUTPATIENT)
Dept: PHYSICAL THERAPY | Age: 33
Setting detail: THERAPIES SERIES
Discharge: HOME | End: 2018-10-25
Payer: COMMERCIAL

## 2018-10-25 PROCEDURE — 97110 THERAPEUTIC EXERCISES: CPT

## 2018-10-25 PROCEDURE — 97112 NEUROMUSCULAR REEDUCATION: CPT

## 2018-10-25 PROCEDURE — G0283 ELEC STIM OTHER THAN WOUND: HCPCS

## 2018-10-25 PROCEDURE — 97140 MANUAL THERAPY 1/> REGIONS: CPT

## 2018-10-25 NOTE — FLOWSHEET NOTE
Reviewed/Progressed HEP activities related to improving balance, coordination, kinesthetic sense, posture, motor skill, proprioception of scapular, scapulothoracic and UE control with self care, reaching, carrying, lifting, house/yardwork, driving/computer work      Manual Treatments:  PROM / STM / Oscillations-Mobs:  G-I, II, III, IV (PA's, Inf., Post.) scapular tape applied to posterior shoulder to help with scapular retractions  [x] (82805) Provided manual therapy to mobilize soft tissue/joints of cervical/CT, scapular GHJ and UE for the purpose of modulating pain, promoting relaxation,  increasing ROM, reducing/eliminating soft tissue swelling/inflammation/restriction, improving soft tissue extensibility and allowing for proper ROM for normal function with self care, reaching, carrying, lifting, house/yardwork, driving/computer work    Spoke with   regarding the use of Dry Needling     Dry needling manual therapy: consisted on the placement of 4 needles in the following muscles:  R UT.  A 30 mm needle was inserted, piston, rotated, and coned to produce intramuscular mobilization. These techniques were used to restore functional range of motion, reduce muscle spasm and induce healing in the corresponding musculature. (73066)  Clean Technique was utilized today while applying Dry needling treatment. The treatment sites where cleaned with 70% solution of  isopropyl alcohol . The PT washed their hands and utilized treatment gloves along with hand  prior to inserting the needles. All needles where removed and discarded in the appropriate sharps container. MD has given verbal and/or written approval for this treatment.             Modalities:  Gameready with stim x 15 min     Charges:  Timed Code Treatment Minutes: 70   Total Treatment Minutes: 85     [] EVAL  [x] BY(12051) x  2   [] IONTO  [x] NMR (75615) x  1   [] VASO  [x] Manual (59466) x  1    [] Other:  [] TA x       [] Mech Traction

## 2018-10-29 ENCOUNTER — HOSPITAL ENCOUNTER (OUTPATIENT)
Dept: PHYSICAL THERAPY | Age: 33
Setting detail: THERAPIES SERIES
Discharge: HOME | End: 2018-10-29
Payer: COMMERCIAL

## 2018-10-29 PROCEDURE — 97110 THERAPEUTIC EXERCISES: CPT

## 2018-10-29 PROCEDURE — 97112 NEUROMUSCULAR REEDUCATION: CPT

## 2018-10-29 PROCEDURE — 97140 MANUAL THERAPY 1/> REGIONS: CPT

## 2018-10-29 PROCEDURE — G0283 ELEC STIM OTHER THAN WOUND: HCPCS

## 2018-10-29 NOTE — FLOWSHEET NOTE
[facility]    Physical Therapy Daily Treatment Note  Date:  10/29/2018    Patient Name:  Юлия Naik    :  1985  MRN: [id number]  Restrictions/Precautions:    Medical/Treatment Diagnosis Information:  · Diagnosis: s/p left shoulder scope 8/10/18; tear of left glenoid labrum S43.432D  · Treatment Diagnosis: L shoulder pain  Insurance/Certification information:  PT Insurance Information: [employer]/[facility], $2600 deductible, no copay, 25 OP visits  Physician Information:  Referring Practitioner: Umberto Pino DO  Plan of care signed (Y/N):     Date of Patient follow up with Physician:  Santiago Cordero     G-Code (if applicable):      Date G-Code Applied:         Progress Note: [x]  Yes  []  No  Next due by: Visit #10      Latex Allergy:  [x]NO      []YES  Preferred Language for Healthcare:   [x]English       []other:    Visit # Insurance Allowable   14 25     Pain level: 3/10    SUBJECTIVE: pt states that shoulder does not aggravate him at work or home. Nothing really provokes pain during the day. His pain is specific to nighttime when he feels he rolls onto his left side. Does not wake due to pain, but has increased symptoms in AM.    OBJECTIVE: 11+ weeks s/p  Observation:   Test measurements:    18 PROM left shoulder. Excellent PROM. Portals healed and closed. Advised begin STM with Vit E to assist in healing portals.      RESTRICTIONS/PRECAUTIONS:   Left shoulder diagnostic arthroscopy  2.  Left shoulder arthroscopic anterior superior labral repair  3.  Left shoulder subacromial decompression, debridement of the Meso-acromion  4.  Left shoulder long head of the biceps tenodesis using 2 dual loaded ultra nadeem  5.  Left shoulder labral debridement, synovectomy, undersurface rotator cuff debridement  6.  Left shoulder rotator cuff augmentation using the regeneten augmentation graft to cover the biceps tenodesis    Exercises/Interventions:   Therapeutic Treatment Minutes: 60   Total Treatment Minutes: 75     [] EVAL  [x] UJ(94512) x  2   [] IONTO  [x] NMR (34036) x  1   [] VASO  [x] Manual (95575) x  1    [] Other:  [] TA x       [] Mech Traction (66032)  [] ES(attended) (01778)      [x] ES (un) (38613):     GOALS:  Patient stated goal: return to work, care for son, play softball    Therapist goals for Patient:   Short Term Goals: To be achieved in: 2 weeks  1. Independent in HEP and progression per patient tolerance, in order to prevent re-injury. 2. Patient will have a decrease in pain to facilitate improvement in movement, function, and ADLs as indicated by Functional Deficits. Long Term Goals: To be achieved in: 8 weeks  1. Disability index score of 15% or less for the DASH to assist with reaching prior level of function. 2. Patient will demonstrate increased AROM to [facility] to allow for proper joint functioning as indicated by patients Functional Deficits. 3. Patient will demonstrate an increase in Strength to within 10lbs to allow for proper functional mobility as indicated by patients Functional Deficits. 4. Patient will return to ADLs without increased symptoms or restriction. 5. Pt will tolerate reaching for high shelf without increased symptoms     Progression Towards Functional goals:  [x] Patient is progressing as expected towards functional goals listed. [] Progression is slowed due to complexities listed. [] Progression has been slowed due to co-morbidities. [] Plan just implemented, too soon to assess goals progression  [] Other:     ASSESSMENT: tender over the lateral joint line and anterior joint line. No pain provocation with any exercises.  Pt advised to try sleeping in sling again to avoid sleeping on left side for prolonged period and hold shoulder in OPP    Treatment/Activity Tolerance:  [x] Patient tolerated treatment well [x] Patient limited by fatique  [] Patient limited by pain  [] Patient limited by other medical

## 2018-11-02 ENCOUNTER — HOSPITAL ENCOUNTER (OUTPATIENT)
Dept: PHYSICAL THERAPY | Age: 33
Setting detail: THERAPIES SERIES
Discharge: HOME | End: 2018-11-02
Payer: COMMERCIAL

## 2018-11-02 PROCEDURE — G0283 ELEC STIM OTHER THAN WOUND: HCPCS

## 2018-11-02 PROCEDURE — 97110 THERAPEUTIC EXERCISES: CPT

## 2018-11-02 PROCEDURE — 97112 NEUROMUSCULAR REEDUCATION: CPT

## 2018-11-02 PROCEDURE — 97140 MANUAL THERAPY 1/> REGIONS: CPT

## 2018-11-02 NOTE — FLOWSHEET NOTE
shoulder D2 ext green  green 3 10    Bicep curl 5# 3 10    SL abduction 2#      SL ER 3# 3 10x    TB ROWS/EXT   High rows   TB ER Black  black  Black 3 10ea    TB ER in 90/90 supported  Tb IR in 90/90 Red  green 3 10    Shoulder flexion 2# 2 10 @ mirror   NO money  green 3 10    Lat pulldown 55# 3 10           Manual Intervention 10'       Shld /GH Mobs       Post Cap mobs       Thoracic/Rib manipualtion    Prone pa mobs   Dry needling  R UT   PROM MT  5 min     IA STM  5 min  Scar tissue          NMR re-education 10'       Wall walks  1 10 ea    7 ounces 15\" 5x      10\" 10 @ 1/2 wall   Body blade ER/IR & FLex  yellow 15\" 5x    Wall Ball bounce 14 oz 1 30 T, Y, ER   MB slammer, chest pass rebounder 6# 1 20    Wall pushups+  2 10x        Therapeutic Exercise and NMR EXR  [x] (71167) Provided verbal/tactile cueing for activities related to strengthening, flexibility, endurance, ROM  for improvements in scapular, scapulothoracic and UE control with self care, reaching, carrying, lifting, house/yardwork, driving/computer work.    [] (19867) Provided verbal/tactile cueing for activities related to improving balance, coordination, kinesthetic sense, posture, motor skill, proprioception  to assist with  scapular, scapulothoracic and UE control with self care, reaching, carrying, lifting, house/yardwork, driving/computer work. Therapeutic Activities:    [] (29094 or 15206) Provided verbal/tactile cueing for activities related to improving balance, coordination, kinesthetic sense, posture, motor skill, proprioception and motor activation to allow for proper function of scapular, scapulothoracic and UE control with self care, carrying, lifting, driving/computer work.      Home Exercise Program:    [x] (53736) Reviewed/Progressed HEP activities related to strengthening, flexibility, endurance, ROM of scapular, scapulothoracic and UE control with self care, reaching, carrying, lifting, house/yardwork, driving/computer

## 2018-11-08 ENCOUNTER — HOSPITAL ENCOUNTER (OUTPATIENT)
Dept: PHYSICAL THERAPY | Age: 33
Setting detail: THERAPIES SERIES
Discharge: HOME | End: 2018-11-08
Payer: COMMERCIAL

## 2018-11-08 ENCOUNTER — OFFICE VISIT (OUTPATIENT)
Dept: ORTHOPEDIC SURGERY | Age: 33
End: 2018-11-08

## 2018-11-08 VITALS — BODY MASS INDEX: 29.81 KG/M2 | HEIGHT: 69 IN | WEIGHT: 201.28 LBS

## 2018-11-08 DIAGNOSIS — S43.432D TEAR OF LEFT GLENOID LABRUM, SUBSEQUENT ENCOUNTER: ICD-10-CM

## 2018-11-08 DIAGNOSIS — S43.432A TEAR OF LEFT GLENOID LABRUM, INITIAL ENCOUNTER: Primary | ICD-10-CM

## 2018-11-08 PROCEDURE — 97140 MANUAL THERAPY 1/> REGIONS: CPT | Performed by: SPECIALIST/TECHNOLOGIST

## 2018-11-08 PROCEDURE — 97112 NEUROMUSCULAR REEDUCATION: CPT | Performed by: SPECIALIST/TECHNOLOGIST

## 2018-11-08 PROCEDURE — 99024 POSTOP FOLLOW-UP VISIT: CPT | Performed by: ORTHOPAEDIC SURGERY

## 2018-11-08 PROCEDURE — 97110 THERAPEUTIC EXERCISES: CPT | Performed by: SPECIALIST/TECHNOLOGIST

## 2018-11-08 NOTE — FLOWSHEET NOTE
10\" 3 Manual, PNF with HABD   Supine shoulder flexion  Supine shoulder D2 ext green  green 3 10    Bicep curl  3 10    SL abduction 2#      SL ER  3 10x    TB ROWS/EXT   High rows   TB ER    TB ER in 90/90 supported  Tb IR in 90/90 Red  green 3 10    Shoulder flexion 2# 2 10 @ mirror   NO money  green 3 10    Lat pulldown  Rows  EXT  ER/IR  Bicep/tricep 55#  45#  10#  5#/ 10#  25#B/SA15# 3 10    IR towel/ IR sleeper  30\" 3x    Manual Intervention 10'       Shld /GH Mobs       Post Cap mobs       Thoracic/Rib manipualtion    Prone pa mobs   Dry needling  R UT   PROM MT  5 min     IA STM  5 min  Scar tissue          NMR re-education 10'       Prone rhythmic stabs w/ SB   15\" 5x    Wall walks  1 10 ea   Rhythmic stabs x2 positions 2# 15\" 5x      10\" 10 @ 1/2 wall   Body blade ER/IR &   FLex  Black/  yellow 30 3x    Wall Ball bounce T, Y, ER   MB slammer, chest pass rebounder 6# 1 20    Supine 90/90 yellow  Prone 90/90 Yellow   x12  2x 10     Standing flexion and d2 flex Red 3 10x     counterpushups+  3 10x        Therapeutic Exercise and NMR EXR  [x] (78995) Provided verbal/tactile cueing for activities related to strengthening, flexibility, endurance, ROM  for improvements in scapular, scapulothoracic and UE control with self care, reaching, carrying, lifting, house/yardwork, driving/computer work.    [] (98023) Provided verbal/tactile cueing for activities related to improving balance, coordination, kinesthetic sense, posture, motor skill, proprioception  to assist with  scapular, scapulothoracic and UE control with self care, reaching, carrying, lifting, house/yardwork, driving/computer work.     Therapeutic Activities:    [] (30089 or 26450) Provided verbal/tactile cueing for activities related to improving balance, coordination, kinesthetic sense, posture, motor skill, proprioception and motor activation to allow for proper function of scapular, scapulothoracic and UE control with self care, carrying, lifting, achieved in: 8 weeks  1. Disability index score of 15% or less for the DASH to assist with reaching prior level of function. 2. Patient will demonstrate increased AROM to [facility] to allow for proper joint functioning as indicated by patients Functional Deficits. 3. Patient will demonstrate an increase in Strength to within 10lbs to allow for proper functional mobility as indicated by patients Functional Deficits. 4. Patient will return to ADLs without increased symptoms or restriction. 5. Pt will tolerate reaching for high shelf without increased symptoms     Progression Towards Functional goals:  [x] Patient is progressing as expected towards functional goals listed. [] Progression is slowed due to complexities listed. [] Progression has been slowed due to co-morbidities. [] Plan just implemented, too soon to assess goals progression  [] Other:     ASSESSMENT:   Shoulder continues to improve with overall shoulder mobility and stability, with good scapulohumeral mechanics. Denies any issues of pain etc, good RTC stability and proprioceptive qualities.      Treatment/Activity Tolerance:  [x] Patient tolerated treatment well [x] Patient limited by fatique  [] Patient limited by pain  [] Patient limited by other medical complications  [] Other:     Prognosis: [x] Good [] Fair  [] Poor    Patient Requires Follow-up: [x] Yes  [] No    PLAN:  decrease freq to 1x/week for additional strength, and stability training  [x] Continue per plan of care [] Alter current plan (see comments)  [] Plan of care initiated [x] Hold pending MD visit Murphy today [] Discharge    Electronically signed by: Tricia Richardson PTA, [id number]

## 2018-11-13 ENCOUNTER — HOSPITAL ENCOUNTER (OUTPATIENT)
Dept: PHYSICAL THERAPY | Age: 33
Setting detail: THERAPIES SERIES
Discharge: HOME | End: 2018-11-13
Payer: COMMERCIAL

## 2018-11-13 PROCEDURE — 97112 NEUROMUSCULAR REEDUCATION: CPT

## 2018-11-13 PROCEDURE — 97110 THERAPEUTIC EXERCISES: CPT

## 2018-11-13 PROCEDURE — 97140 MANUAL THERAPY 1/> REGIONS: CPT

## 2018-11-13 NOTE — FLOWSHEET NOTE
ER  3 10x    TB ER in 90/90 supported  Tb IR in 90/90 Red  green 3 10    Shoulder flexion 2# 2 10 @ mirror   NO money  green 3 10    Lat pulldown  Rows  EXT  ER/IR  Bicep/tricep 55#  45#  10#  5#/ 10#  25#B/SA15# 3 10    IR towel/ IR sleeper  30\" 3x    Manual Intervention 10'       Shld /GH Mobs       Post Cap mobs       Thoracic/Rib manipualtion    Prone pa mobs   Dry needling  R UT   PROM MT  5 min     IA STM  5 min  Scar tissue          NMR re-education 10'       Prone rhythmic stabs w/ SB   15\" 5x     carry 5# 2 laps ea   Rhythmic stabs x2 positions 2# 15\" 5x      10\" 10 @ 1/2 wall   Body blade ER/IR &   FLex  Black/  yellow 30 3x    Wall Ball bounce T, Y, ER   MB slammer, chest pass rebounder 6# 1 20    Supine 90/90 yellow  Prone 90/90 Yellow   x12  2x 10     Standing flexion and d2 flex Red 3 10x     counterpushups+  3 10x        Therapeutic Exercise and NMR EXR  [x] (78778) Provided verbal/tactile cueing for activities related to strengthening, flexibility, endurance, ROM  for improvements in scapular, scapulothoracic and UE control with self care, reaching, carrying, lifting, house/yardwork, driving/computer work.    [] (47052) Provided verbal/tactile cueing for activities related to improving balance, coordination, kinesthetic sense, posture, motor skill, proprioception  to assist with  scapular, scapulothoracic and UE control with self care, reaching, carrying, lifting, house/yardwork, driving/computer work. Therapeutic Activities:    [] (78958 or 76927) Provided verbal/tactile cueing for activities related to improving balance, coordination, kinesthetic sense, posture, motor skill, proprioception and motor activation to allow for proper function of scapular, scapulothoracic and UE control with self care, carrying, lifting, driving/computer work.      Home Exercise Program:    [x] (96585) Reviewed/Progressed HEP activities related to strengthening, flexibility, endurance, ROM of scapular,

## 2018-11-20 ENCOUNTER — APPOINTMENT (OUTPATIENT)
Dept: PHYSICAL THERAPY | Age: 33
End: 2018-11-20
Payer: COMMERCIAL

## 2018-11-27 ENCOUNTER — HOSPITAL ENCOUNTER (OUTPATIENT)
Dept: PHYSICAL THERAPY | Age: 33
Setting detail: THERAPIES SERIES
Discharge: HOME | End: 2018-11-27
Payer: COMMERCIAL

## 2018-11-27 PROCEDURE — 97112 NEUROMUSCULAR REEDUCATION: CPT | Performed by: SPECIALIST/TECHNOLOGIST

## 2018-11-27 PROCEDURE — 97110 THERAPEUTIC EXERCISES: CPT | Performed by: SPECIALIST/TECHNOLOGIST

## 2018-11-27 PROCEDURE — 97140 MANUAL THERAPY 1/> REGIONS: CPT | Performed by: SPECIALIST/TECHNOLOGIST

## 2018-11-27 NOTE — FLOWSHEET NOTE
shoulder flexion   Supine shoulder D2 ext lime 3 10    Bicep curl 5# 3 10    SL abduction     SL ER/ punch 3# 3 10x    TB ER in 90/90 supported  Tb IR in 90/90 10    Shoulder flexion 2# 2 10 @ mirror   NO money  green 3 10    Lat pulldown  Rows  EXT  ER/IR  Bicep/tricep 60#  50#  10#  5#/ 25#B/SA15# 3 10    IR towel/ IR sleeper  30\" 3x    Manual Intervention 10'       Shld /GH Mobs       Post Cap mobs       Thoracic/Rib manipualtion    Prone pa mobs   Dry needling  R UT   PROM MT  5 min     IA STM  5 min  Scar tissue          NMR re-education 10'       5x     carry ea   Rhythmic stabs x2 positions 2# 15\" 5x      10\" 10 @ 1/2 wall   Body blade ER/IR &   Flex  Black   15-30\" 5x    Wall Ball bounce T, Y, ER   MB slammer, chest pass rebounder 6# 1 20    Supine 90/90 yellow  Prone 90/90 Yellow   x12  2x 10     Standing flexion and d2 flex Red 3 10x     counterpushups+  3 10x        Therapeutic Exercise and NMR EXR  [x] (29823) Provided verbal/tactile cueing for activities related to strengthening, flexibility, endurance, ROM  for improvements in scapular, scapulothoracic and UE control with self care, reaching, carrying, lifting, house/yardwork, driving/computer work.    [] (24717) Provided verbal/tactile cueing for activities related to improving balance, coordination, kinesthetic sense, posture, motor skill, proprioception  to assist with  scapular, scapulothoracic and UE control with self care, reaching, carrying, lifting, house/yardwork, driving/computer work. Therapeutic Activities:    [] (38519 or 90410) Provided verbal/tactile cueing for activities related to improving balance, coordination, kinesthetic sense, posture, motor skill, proprioception and motor activation to allow for proper function of scapular, scapulothoracic and UE control with self care, carrying, lifting, driving/computer work.      Home Exercise Program:    [x] (08263) Reviewed/Progressed HEP activities related to strengthening, Patient will demonstrate increased AROM to [facility] to allow for proper joint functioning as indicated by patients Functional Deficits. 3. Patient will demonstrate an increase in Strength to within 10lbs to allow for proper functional mobility as indicated by patients Functional Deficits. 4. Patient will return to ADLs without increased symptoms or restriction. 5. Pt will tolerate reaching for high shelf without increased symptoms     Progression Towards Functional goals:  [x] Patient is progressing as expected towards functional goals listed. [] Progression is slowed due to complexities listed. [] Progression has been slowed due to co-morbidities. [] Plan just implemented, too soon to assess goals progression  [] Other:     ASSESSMENT:    Pt continues to tolerate OH strengthening well, but he fatigues quickly with 90/90 ER.  Attempted to resume a majority of his program today and admits to increased overall fatigue levels with program especially with RTC strength    Treatment/Activity Tolerance:  [x] Patient tolerated treatment well [x] Patient limited by fatique  [] Patient limited by pain  [] Patient limited by other medical complications  [] Other:     Prognosis: [x] Good [] Fair  [] Poor    Patient Requires Follow-up: [x] Yes  [] No    PLAN:  decrease freq to 1x/week for additional strength, and stability training increase HEP as able  [x] Continue per plan of care [] Alter current plan (see comments)  [] Plan of care initiated [x] Hold pending MD visit Greta Foster today [] Discharge    Electronically signed by: Mehul Boss PTA, [id number]

## 2018-12-06 ENCOUNTER — APPOINTMENT (OUTPATIENT)
Dept: PHYSICAL THERAPY | Age: 33
End: 2018-12-06
Payer: COMMERCIAL

## 2018-12-07 ENCOUNTER — HOSPITAL ENCOUNTER (OUTPATIENT)
Dept: PHYSICAL THERAPY | Age: 33
Setting detail: THERAPIES SERIES
Discharge: HOME | End: 2018-12-07
Payer: COMMERCIAL

## 2018-12-07 PROCEDURE — 97016 VASOPNEUMATIC DEVICE THERAPY: CPT

## 2018-12-07 PROCEDURE — 97110 THERAPEUTIC EXERCISES: CPT

## 2018-12-07 PROCEDURE — 97112 NEUROMUSCULAR REEDUCATION: CPT

## 2018-12-24 ENCOUNTER — HOSPITAL ENCOUNTER (OUTPATIENT)
Dept: PHYSICAL THERAPY | Age: 33
Setting detail: THERAPIES SERIES
End: 2018-12-24
Payer: COMMERCIAL

## 2018-12-31 ENCOUNTER — OFFICE VISIT (OUTPATIENT)
Dept: ORTHOPEDIC SURGERY | Age: 33
End: 2018-12-31
Payer: COMMERCIAL

## 2018-12-31 VITALS — WEIGHT: 201.28 LBS | HEIGHT: 69 IN | BODY MASS INDEX: 29.81 KG/M2

## 2018-12-31 DIAGNOSIS — S43.432A TEAR OF LEFT GLENOID LABRUM, INITIAL ENCOUNTER: Primary | ICD-10-CM

## 2018-12-31 PROCEDURE — 99213 OFFICE O/P EST LOW 20 MIN: CPT | Performed by: ORTHOPAEDIC SURGERY

## 2018-12-31 NOTE — PROGRESS NOTES
12/31/18  History of Present Illness:  Pramod Bobo is a 34 y.o. male    Patient's chief complaint in the office today includes: Recheck evaluation left shoulder postop 8/10/18 subacromial decompression, distal clavicle excision, long head of the biceps tenodesis, labral repair, and rotator cuff augmentation    Location left Shoulder  Severity doing much better  Duration more than 4 months  Associated sign/symptoms pain is significantly improved motion and strength is significantly improved    Medical History  Patient's medications, allergies, past medical, surgical, social and family histories were reviewed and updated as appropriate. Review of Systems  No rash  No numbness  No tingling  No fevers  No depression    Pertinent items are noted in HPI  Review of systems reviewed from Patient History Form dated on 8/10/18 and available in the patient's chart under the Media tab. No change in his medical history form                                         Examination    General Exam:   Vitals: Height 5' 9.02\" (1.753 m), weight 201 lb 4.5 oz (91.3 kg). Constitutional: Patient is adequately groomed with no evidence of malnutrition  Mental Status: The patient is oriented to time, place and person. The patient's mood and affect are appropriate. PHYSICAL EXAM:      Shoulder Examination  Inspection:  No swelling, no deformity, no erythema, no drainage, no signs of infection     Palpation:  Palpation reveals no effusion minimal pain, no warmth,     Range of Motion:  genital range of motion with no crepitus passive motion to 150° of forward flextion    Strength:  Intact strength with internal and external rotation along with  supraspinatus isolation bilaterally, Shoulder shrug is 5 over 5 , cervical spine strength is excellent, flexion extension at the elbow is 5 over 5 wrist and hand strength is equal bilaterally no winging no muscle atrophy.    Palpation:  Lateral deltoid no pain to palpation  AC joint no pain to palopation  No  pain Anterior to palpation  No pain Posterior to palpation  No trapezial pain to palpation  Range of Motion:  Abduction --150 degrees  Flexion-- 180 degrees  Extension-- between 45-60 degrees  Latera/external  rotation --close to 90 degrees  Medial/ internal rotation -- between 70-90 degrees    Strength:  Left shoulder strength:   internal rotation against resistance is 5/5  external rotation against resistance is 5/5  and supraspinatus isolation against resistance is 5/5, Shoulder shrug is 5 over 5 , cervical spine strength is excellent, flexion extension at the elbow is 5 over 5 wrist and hand strength is equal bilaterally with supination pronation and flexion and extension  no winging no muscle atrophy. Special Tests: Palpation demonstrates no swelling no effusion no pain. There is full active and passive range of motion bilaterally. Strength is excellent with internal rotation against resistance external rotation against resistance supraspinatus isolation against resistance. Shoulder shrug strength is 5 over 5 equal bilaterally. Radial ulnar and median nerve function is intact. Capillary refill is brisk. Elbow motion finger and wrist motion is full equal bilaterally. Deep tendon reflexes of the Brachial radialis, biceps, tricepsAre all +2/4 equal bilaterally. Cervical spine range of motion is full without pain negative Spurling's test.  Load-and-shift test is negative. Crank test is negative. Apprehension and relocation is negative. Anterior and posterior glide are equal bilaterally. Negative sulcus sign. No signs of any significant multidirectional instability. There is no scapular winging. There is no muscle atrophy of the latissimus dorsi, the deltoid, the periscapular musculature,The trapezius musculature or the pectoralis musculature. Negative Neer's test, negative Beltran test, no pain with crossarm elevation.       Contralateral